# Patient Record
Sex: MALE | Race: BLACK OR AFRICAN AMERICAN | NOT HISPANIC OR LATINO | URBAN - METROPOLITAN AREA
[De-identification: names, ages, dates, MRNs, and addresses within clinical notes are randomized per-mention and may not be internally consistent; named-entity substitution may affect disease eponyms.]

---

## 2018-06-11 ENCOUNTER — EMERGENCY (EMERGENCY)
Facility: HOSPITAL | Age: 38
LOS: 1 days | Discharge: HOME | End: 2018-06-11
Attending: EMERGENCY MEDICINE | Admitting: EMERGENCY MEDICINE
Payer: SELF-PAY

## 2018-06-11 VITALS
TEMPERATURE: 98 F | OXYGEN SATURATION: 98 % | HEART RATE: 87 BPM | DIASTOLIC BLOOD PRESSURE: 95 MMHG | RESPIRATION RATE: 16 BRPM | SYSTOLIC BLOOD PRESSURE: 158 MMHG

## 2018-06-11 LAB
ALBUMIN SERPL ELPH-MCNC: 4.3 G/DL — SIGNIFICANT CHANGE UP (ref 3.3–5)
ALP SERPL-CCNC: 58 U/L — SIGNIFICANT CHANGE UP (ref 40–120)
ALT FLD-CCNC: 30 U/L — SIGNIFICANT CHANGE UP (ref 4–41)
AST SERPL-CCNC: 30 U/L — SIGNIFICANT CHANGE UP (ref 4–40)
BASOPHILS # BLD AUTO: 0.03 K/UL — SIGNIFICANT CHANGE UP (ref 0–0.2)
BASOPHILS NFR BLD AUTO: 0.7 % — SIGNIFICANT CHANGE UP (ref 0–2)
BILIRUB SERPL-MCNC: 0.2 MG/DL — SIGNIFICANT CHANGE UP (ref 0.2–1.2)
BUN SERPL-MCNC: 16 MG/DL — SIGNIFICANT CHANGE UP (ref 7–23)
CALCIUM SERPL-MCNC: 9.3 MG/DL — SIGNIFICANT CHANGE UP (ref 8.4–10.5)
CHLORIDE SERPL-SCNC: 100 MMOL/L — SIGNIFICANT CHANGE UP (ref 98–107)
CO2 SERPL-SCNC: 26 MMOL/L — SIGNIFICANT CHANGE UP (ref 22–31)
CREAT SERPL-MCNC: 0.87 MG/DL — SIGNIFICANT CHANGE UP (ref 0.5–1.3)
EOSINOPHIL # BLD AUTO: 0.12 K/UL — SIGNIFICANT CHANGE UP (ref 0–0.5)
EOSINOPHIL NFR BLD AUTO: 2.9 % — SIGNIFICANT CHANGE UP (ref 0–6)
GLUCOSE SERPL-MCNC: 83 MG/DL — SIGNIFICANT CHANGE UP (ref 70–99)
HCT VFR BLD CALC: 43.9 % — SIGNIFICANT CHANGE UP (ref 39–50)
HGB BLD-MCNC: 14.7 G/DL — SIGNIFICANT CHANGE UP (ref 13–17)
IMM GRANULOCYTES # BLD AUTO: 0 # — SIGNIFICANT CHANGE UP
IMM GRANULOCYTES NFR BLD AUTO: 0 % — SIGNIFICANT CHANGE UP (ref 0–1.5)
LYMPHOCYTES # BLD AUTO: 2.05 K/UL — SIGNIFICANT CHANGE UP (ref 1–3.3)
LYMPHOCYTES # BLD AUTO: 50 % — HIGH (ref 13–44)
MCHC RBC-ENTMCNC: 30.9 PG — SIGNIFICANT CHANGE UP (ref 27–34)
MCHC RBC-ENTMCNC: 33.5 % — SIGNIFICANT CHANGE UP (ref 32–36)
MCV RBC AUTO: 92.2 FL — SIGNIFICANT CHANGE UP (ref 80–100)
MONOCYTES # BLD AUTO: 0.27 K/UL — SIGNIFICANT CHANGE UP (ref 0–0.9)
MONOCYTES NFR BLD AUTO: 6.6 % — SIGNIFICANT CHANGE UP (ref 2–14)
NEUTROPHILS # BLD AUTO: 1.63 K/UL — LOW (ref 1.8–7.4)
NEUTROPHILS NFR BLD AUTO: 39.8 % — LOW (ref 43–77)
NRBC # FLD: 0 — SIGNIFICANT CHANGE UP
PLATELET # BLD AUTO: 206 K/UL — SIGNIFICANT CHANGE UP (ref 150–400)
PMV BLD: 9.5 FL — SIGNIFICANT CHANGE UP (ref 7–13)
POTASSIUM SERPL-MCNC: 3.8 MMOL/L — SIGNIFICANT CHANGE UP (ref 3.5–5.3)
POTASSIUM SERPL-SCNC: 3.8 MMOL/L — SIGNIFICANT CHANGE UP (ref 3.5–5.3)
PROT SERPL-MCNC: 7.4 G/DL — SIGNIFICANT CHANGE UP (ref 6–8.3)
RBC # BLD: 4.76 M/UL — SIGNIFICANT CHANGE UP (ref 4.2–5.8)
RBC # FLD: 11.6 % — SIGNIFICANT CHANGE UP (ref 10.3–14.5)
SODIUM SERPL-SCNC: 139 MMOL/L — SIGNIFICANT CHANGE UP (ref 135–145)
TROPONIN T SERPL-MCNC: < 0.06 NG/ML — SIGNIFICANT CHANGE UP (ref 0–0.06)
WBC # BLD: 4.1 K/UL — SIGNIFICANT CHANGE UP (ref 3.8–10.5)
WBC # FLD AUTO: 4.1 K/UL — SIGNIFICANT CHANGE UP (ref 3.8–10.5)

## 2018-06-11 PROCEDURE — 93010 ELECTROCARDIOGRAM REPORT: CPT | Mod: 59

## 2018-06-11 PROCEDURE — 99220: CPT | Mod: 25

## 2018-06-11 PROCEDURE — 71046 X-RAY EXAM CHEST 2 VIEWS: CPT | Mod: 26

## 2018-06-11 RX ORDER — TETANUS TOXOID, REDUCED DIPHTHERIA TOXOID AND ACELLULAR PERTUSSIS VACCINE, ADSORBED 5; 2.5; 8; 8; 2.5 [IU]/.5ML; [IU]/.5ML; UG/.5ML; UG/.5ML; UG/.5ML
0.5 SUSPENSION INTRAMUSCULAR ONCE
Qty: 0 | Refills: 0 | Status: COMPLETED | OUTPATIENT
Start: 2018-06-11 | End: 2018-06-11

## 2018-06-11 RX ADMIN — TETANUS TOXOID, REDUCED DIPHTHERIA TOXOID AND ACELLULAR PERTUSSIS VACCINE, ADSORBED 0.5 MILLILITER(S): 5; 2.5; 8; 8; 2.5 SUSPENSION INTRAMUSCULAR at 21:48

## 2018-06-11 NOTE — ED CDU PROVIDER INITIAL DAY NOTE - OBJECTIVE STATEMENT
38y M with hx of childhood murmur coming in c/o lightheadedness and 1-2 seconds of syncopal episode while he was sitting down watching something on phone and turning head to left. Prior to passing out felt palpitation. No CP, nausea, or vomiting. Hit forehead on concrete and scraped left knee and hands. Denies incontinent episode, tongue biting, fever, chills, visual changes, leg swelling, SOB, or cough. Uses marijuana occasionally last usage 1 week ago. This had happened to him in past when he does not have enough sleep. Works at [employer]. No family hx of sudden cardiac death    CDU DEIDRE Abarca: Above note review and agree. Pt is 39 yo M with Hx of childhood murmur presenting to the ED c/o episode of lightheadedness, and 2 sec syncopal episode, unwitnessed, pt was sitting down, turned head and fell forward, onto concrete, scraping forehead, knee and hands. Pt has had episodes of syncope in the past " from not getting enough sleep" Denies cp ,sob, palpitations, episode of incontinence, fevers, chills, recent travel. No family hx of cardiac events. + majuana smoker, denies tobacco use. In ED pt labs wnl, EKG reviewed with attg with no evidence of HCOM, brugada, prolonged QT, or arrythmia, Pt sent to CDU for tele monitoring, delta trop, and echo in am.

## 2018-06-11 NOTE — ED ADULT TRIAGE NOTE - CHIEF COMPLAINT QUOTE
Pt c/o feeling lightheaded and had 1 syncopal episode this morning.  Has been having intermittent CP.  Abrasion to head and hand noted.  Pt hit head on floor during episode

## 2018-06-11 NOTE — ED PROVIDER NOTE - MEDICAL DECISION MAKING DETAILS
Syncopal episode possibly due to dehydration vs valvular stenosis vs arrythmia vs electrolyte vs ACS. Obtain labs, EKG, CXR, give tetanus shot, head CT, and admit to CDU for syncope workup

## 2018-06-11 NOTE — ED ADULT NURSE NOTE - OBJECTIVE STATEMENT
38y M with hx of childhood murmur coming in c/o lightheadedness and 1-2 seconds of syncopal episode while he was sitting down watching something on phone and turning head to left. Prior to passing out felt palpitation. No CP, nausea, or vomiting. Hit forehead on concrete and scraped left knee and hands. Denies incontinent episode, tongue biting, fever, chills, visual changes, leg swelling, SOB, or cough. This had happened to him in past when he does not have enough sleep. Pt  noted to be HTN and HR in 50s. ED provider made aware. PIV inserted. labs drawn and sent to lab as ordered. Report given to Intake MARIA A Kumari. 38y M with hx of childhood murmur coming in c/o lightheadedness and 1-2 seconds of syncopal episode while he was sitting down watching something on phone and turning head to left. Prior to passing out felt palpitation. No CP, nausea, or vomiting. Hit forehead on concrete and scraped left knee and hands. Denies incontinent episode, tongue biting, fever, chills, visual changes, leg swelling, SOB, or cough. This had happened to him in past when he does not have enough sleep. Pt  noted to be HTN and HR in 50s. ED provider MD rooney made aware. PIV inserted. labs drawn and sent to lab as ordered. Report given to Intake MARIA A Kumari.

## 2018-06-11 NOTE — ED ADULT NURSE NOTE - ED STAT RN HANDOFF DETAILS
Rpt to [facility] CDU RN - pt vitals as noted HR SBrady 50 - SR 60s.  BP high as noted, MD Irizarry aware, no interventions/medications ordered at this time.  Pt stable and in no dsitress. 1CE neg.  Pt brought to CDU 6 in wheelchair.

## 2018-06-11 NOTE — ED CDU PROVIDER INITIAL DAY NOTE - ATTENDING CONTRIBUTION TO CARE
Irizarry: 38 yr old male with syncope admitted to cdu for 2nd trop/ekg, cardiac monitor, echo in am.

## 2018-06-11 NOTE — ED PROVIDER NOTE - OBJECTIVE STATEMENT
38y M with hx of childhood murmur coming in c/o lightheadedness and 1-2 seconds of syncopal episode while he was sitting down watching something on phone and turning head to left. Prior to passing out felt palpitation. No CP, nausea, or vomiting. Hit forehead on concrete and scraped left knee and hands. Denies incontinent episode, tongue biting, fever, chills, visual changes, leg swelling, SOB, or cough. Uses marijuana occasionally last usage 1 week ago. This had happened to him in past when he does not have enough sleep. Works at [employer]. No family hx of sudden cardiac death

## 2018-06-11 NOTE — ED CDU PROVIDER INITIAL DAY NOTE - MEDICAL DECISION MAKING DETAILS
Pt is 39 yo M with Hx of childhood murmur presenting to the ED c/o episode of lightheadedness, and 2 sec syncopal episode, unwitnessed, pt was sitting down, turned head and fell forward, onto concrete, scraping forehead, knee and hands. No family hx of cardiac events. + majuana smoker, denies tobacco use. In ED pt labs wnl, EKG reviewed with attg with no evidence of HCOM, brugada, prolonged QT, or arrythmia, Pt sent to CDU for tele monitoring, delta trop, and echo in am.

## 2018-06-11 NOTE — ED PROVIDER NOTE - PROGRESS NOTE DETAILS
Irizarry: labs unremarkable. ct head pending.  ekg no arrhytmia no sign of HCOM, no brugada, no prolong QT  admit to cdu for syncope work up will need echo and maintain on cardiac monitor.

## 2018-06-12 VITALS
DIASTOLIC BLOOD PRESSURE: 100 MMHG | TEMPERATURE: 98 F | SYSTOLIC BLOOD PRESSURE: 160 MMHG | OXYGEN SATURATION: 98 % | RESPIRATION RATE: 16 BRPM | HEART RATE: 70 BPM

## 2018-06-12 LAB
HBA1C BLD-MCNC: 5.5 % — SIGNIFICANT CHANGE UP (ref 4–5.6)
TROPONIN T SERPL-MCNC: < 0.06 NG/ML — SIGNIFICANT CHANGE UP (ref 0–0.06)

## 2018-06-12 PROCEDURE — 70450 CT HEAD/BRAIN W/O DYE: CPT | Mod: 26

## 2018-06-12 PROCEDURE — 93010 ELECTROCARDIOGRAM REPORT: CPT | Mod: 77

## 2018-06-12 PROCEDURE — 99217: CPT

## 2018-06-12 PROCEDURE — 93306 TTE W/DOPPLER COMPLETE: CPT | Mod: 26

## 2018-06-12 NOTE — ED CDU PROVIDER DISPOSITION NOTE - ATTENDING CONTRIBUTION TO CARE
I, Jennifer Cabot, MD, have performed a history and physical exam of the patient and discussed their management with the ACP.  I reviewed the PA's note and agree with the documented findings and plan of care. My medical decision making and observations are found above.    Cabot: 38M with PMH of childhood murmur and prior syncope who presented to the ED with lightheadedness followed by brief syncope.  + head strike.  No N/V/numbness/weakness/CP/SOB/palps/incontinence/tongue biting.  No FH of sudden cardiac death.  CT head neg, trop neg x 2, EKG without signs of HCOM, brugada, prolonged QT, or arrythmia.  ECHO shows only mild LVH.  Spoke with cardiology who confirms that this would not be a cause of his syncope.  On exam, HDS, NAD, no murmur, neuro intact, lungs CTAB, heart sounds normal, abd benign, LEs without edema.  Will discharge home with cardiology follow up for further evaluation as needed.

## 2018-06-12 NOTE — ED CDU PROVIDER SUBSEQUENT DAY NOTE - HISTORY
38y M with hx of childhood murmur coming in c/o lightheadedness and 1-2 seconds of syncopal episode while he was sitting down watching something on phone and turning head to left. Pt sent to CDU for tele monitoring, trop, and echo.

## 2018-06-12 NOTE — ED CDU PROVIDER SUBSEQUENT DAY NOTE - PROGRESS NOTE DETAILS
Cabot: 38M with PMH of childhood murmur and prior syncope who presented to the ED with lightheadedness followed by brief syncope.  + head strike.  No N/V/numbness/weakness/CP/SOB/palps/incontinence/tongue biting.  No FH of sudden cardiac death.  CT head neg, trop neg x 2, EKG without signs of HCOM, brugada, prolonged QT, or arrythmia.  Pt pending ECHO.  On exam, HDS, NAD, no murmur, neuro intact, lungs CTAB, heart sounds normal, abd benign, LEs without edema. Patient laying comfortably in bed NAD, No complaints. Echo shows no signs of embolus. Discussed with attending, can dispo patient home to f/u with PCP and urology. The patient was given verbal and written discharge instructions. Specifically, instructions when to return to the ED and when to seek follow-up from their pcp was discussed. Any specialty follow-up was discussed, including how to make an appointment.  Instructions were discussed in simple, plain language and was understood by the patient. The patient understands that should their symptoms worsen or any new symptoms arise, they should return to the ED immediately for further evaluation. All pt's questions were answered. Patient verbalizes understanding. Patient laying comfortably in bed NAD, No complaints. Echo shows concentric left ventricular wall remodeling. Discussed with attending, will discuss with Dr. Carson (cardiology) regarding results. Spoke with Dr. Carson, states concentric left ventricular wall remodeling is the mildest form of thickness, consider patient's height and size, no concern for HOCM, patient likely have underlining hypertension. Discussed with attending, pt can dispo home to f/u with PCP and cardiology. The patient was given verbal and written discharge instructions. Specifically, instructions when to return to the ED and when to seek follow-up from their pcp was discussed. Any specialty follow-up was discussed, including how to make an appointment.  Instructions were discussed in simple, plain language and was understood by the patient. The patient understands that should their symptoms worsen or any new symptoms arise, they should return to the ED immediately for further evaluation. All pt's questions were answered. Patient verbalizes understanding.

## 2018-06-12 NOTE — ED CDU PROVIDER SUBSEQUENT DAY NOTE - MEDICAL DECISION MAKING DETAILS
Cabot: 38M with PMH of childhood murmur and prior syncope who presented to the ED with lightheadedness followed by brief syncope.  + head strike.  No N/V/numbness/weakness/CP/SOB/palps/incontinence/tongue biting.  No FH of sudden cardiac death.  CT head neg, trop neg x 2, EKG without signs of HCOM, brugada, prolonged QT, or arrythmia.  Pt pending ECHO.  On exam, HDS, NAD, no murmur, neuro intact, lungs CTAB, heart sounds normal, abd benign, LEs without edema.

## 2018-06-12 NOTE — ED CDU PROVIDER DISPOSITION NOTE - CLINICAL COURSE
Cabot: 38M with PMH of childhood murmur and prior syncope who presented to the ED with lightheadedness followed by brief syncope.  + head strike.  No N/V/numbness/weakness/CP/SOB/palps/incontinence/tongue biting.  No FH of sudden cardiac death.  CT head neg, trop neg x 2, EKG without signs of HCOM, brugada, prolonged QT, or arrythmia.  ECHO shows only mild LVH.  Spoke with cardiology who confirms that this would not be a cause of his syncope.  On exam, HDS, NAD, no murmur, neuro intact, lungs CTAB, heart sounds normal, abd benign, LEs without edema.  Will discharge home with cardiology follow up for further evaluation as needed.

## 2018-06-12 NOTE — ED CDU PROVIDER DISPOSITION NOTE - PLAN OF CARE
Rest, drink plenty of fluids.  Advance activity as tolerated.  Follow up with your primary care physician in and cardiology 48-72 hours- bring copies of your results.  Call and make appointment on Cardiology referral list. Return to the ER for worsening or persistent symptoms, and/or ANY NEW OR CONCERNING SYMPTOMS. If you have issues obtaining follow up, please call: [phone] to obtain a doctor or specialist who takes your insurance in your area.

## 2020-03-23 NOTE — ED CDU PROVIDER SUBSEQUENT DAY NOTE - CROS ED GU ALL NEG
Condition and ROM has improved.    F/U prn    Ramirez Perez   Orthopaedic Clinical Assistant  [facility]       
negative...

## 2025-01-08 NOTE — ED CDU PROVIDER INITIAL DAY NOTE - CPE EDP EYES NORM
[FreeTextEntry1] : This is a 63-year-old male status post 3 month REZUM. \par \par Patient is currently satisfied with procedure results.\par \par Follow-up 3 months for uroflow and PVR 6 month s/p procedure. \par If continued to do well can resume follow-up with Dr. Hunt for low testosterone in male normal... normal

## 2025-08-11 ENCOUNTER — EMERGENCY (EMERGENCY)
Facility: HOSPITAL | Age: 45
LOS: 1 days | End: 2025-08-11
Admitting: STUDENT IN AN ORGANIZED HEALTH CARE EDUCATION/TRAINING PROGRAM
Payer: COMMERCIAL

## 2025-08-11 VITALS
WEIGHT: 169.98 LBS | DIASTOLIC BLOOD PRESSURE: 99 MMHG | TEMPERATURE: 98 F | SYSTOLIC BLOOD PRESSURE: 165 MMHG | RESPIRATION RATE: 18 BRPM | HEART RATE: 85 BPM | OXYGEN SATURATION: 100 %

## 2025-08-11 PROCEDURE — 73130 X-RAY EXAM OF HAND: CPT | Mod: 26,RT

## 2025-08-11 PROCEDURE — 99284 EMERGENCY DEPT VISIT MOD MDM: CPT

## 2025-08-11 RX ORDER — CEPHALEXIN 250 MG/1
1 CAPSULE ORAL
Qty: 28 | Refills: 0
Start: 2025-08-11 | End: 2025-08-17

## 2025-08-11 RX ORDER — IBUPROFEN 200 MG
600 TABLET ORAL ONCE
Refills: 0 | Status: COMPLETED | OUTPATIENT
Start: 2025-08-11 | End: 2025-08-11

## 2025-08-11 RX ADMIN — Medication 600 MILLIGRAM(S): at 19:03
